# Patient Record
Sex: MALE | Race: WHITE | HISPANIC OR LATINO | URBAN - METROPOLITAN AREA
[De-identification: names, ages, dates, MRNs, and addresses within clinical notes are randomized per-mention and may not be internally consistent; named-entity substitution may affect disease eponyms.]

---

## 2018-06-28 ENCOUNTER — OFFICE VISIT (OUTPATIENT)
Dept: URBAN - METROPOLITAN AREA CLINIC 48 | Facility: CLINIC | Age: 63
End: 2018-06-28
Payer: MEDICAID

## 2018-06-28 PROCEDURE — 99213 OFFICE O/P EST LOW 20 MIN: CPT | Performed by: OPHTHALMOLOGY

## 2018-06-28 ASSESSMENT — INTRAOCULAR PRESSURE
OD: 14
OS: 15

## 2018-06-28 NOTE — IMPRESSION/PLAN
Impression: Type 2 diabetes mellitus w/o complication: J37.5. Plan: Diabetes type II: no background retinopathy, no signs of neovascularization noted. Discussed ocular and systemic benefits of blood sugar control.

## 2020-10-05 ENCOUNTER — OFFICE VISIT (OUTPATIENT)
Dept: URBAN - METROPOLITAN AREA CLINIC 48 | Facility: CLINIC | Age: 65
End: 2020-10-05
Payer: COMMERCIAL

## 2020-10-05 DIAGNOSIS — E11.9 TYPE 2 DIABETES MELLITUS W/O COMPLICATION: Primary | ICD-10-CM

## 2020-10-05 PROCEDURE — 92004 COMPRE OPH EXAM NEW PT 1/>: CPT | Performed by: OPTOMETRIST

## 2020-10-05 ASSESSMENT — INTRAOCULAR PRESSURE
OD: 17
OS: 17

## 2020-10-05 NOTE — IMPRESSION/PLAN
Impression: Dermatochalasis of left upper eyelid: H02.834. Patient interested in talking with Dr. Geremias Gaviria about possible eyelid surgery Plan: Discussed with patient.  

RTC 2-4 weeks lid acacia, T/UT with Reny

## 2020-10-05 NOTE — IMPRESSION/PLAN
Impression: Type 2 diabetes mellitus w/o complication: B67.4. Plan: Discussed with patient the importance of glucose control and ocular risk. 

Follow up annually with dilated fundus exam.

## 2020-11-03 ENCOUNTER — OFFICE VISIT (OUTPATIENT)
Dept: URBAN - METROPOLITAN AREA CLINIC 48 | Facility: CLINIC | Age: 65
End: 2020-11-03
Payer: COMMERCIAL

## 2020-11-03 DIAGNOSIS — H02.831 DERMATOCHALASIS OF RIGHT UPPER EYELID: ICD-10-CM

## 2020-11-03 DIAGNOSIS — H11.052 PERIPHERAL PTERYGIUM, PROGRESSIVE, LEFT EYE: Primary | ICD-10-CM

## 2020-11-03 PROCEDURE — 92081 LIMITED VISUAL FIELD XM: CPT | Performed by: OPHTHALMOLOGY

## 2020-11-03 PROCEDURE — 99214 OFFICE O/P EST MOD 30 MIN: CPT | Performed by: OPHTHALMOLOGY

## 2020-11-03 RX ORDER — OFLOXACIN 3 MG/ML
0.3 % SOLUTION/ DROPS OPHTHALMIC
Qty: 1 | Refills: 0 | Status: INACTIVE
Start: 2020-11-03 | End: 2021-06-17

## 2020-11-03 RX ORDER — PREDNISOLONE ACETATE 10 MG/ML
1 % SUSPENSION/ DROPS OPHTHALMIC
Qty: 1 | Refills: 2 | Status: INACTIVE
Start: 2020-11-03 | End: 2021-06-17

## 2020-11-03 ASSESSMENT — INTRAOCULAR PRESSURE
OD: 16
OS: 15

## 2020-11-03 NOTE — ASSESSMENT/PLAN
Impression: Visual Field - OD: Good-no visual field obstruction; OS: Good-no visual field obstruction Plan: See plan #1.

## 2020-11-03 NOTE — IMPRESSION/PLAN
Impression: Peripheral pterygium, progressive, left eye: H11.052. Plan: Discussed diagnosis in detail with patient. Discussed treatment options with patient. Surgical risks and benefits were discussed, explained and understood by patient. Surgical treatment is required. Patient elects to proceed with surgery. Recommend Pterygium Excision w/o Graft and w/ MMC of the Left Eye. RL2.

*Patient is currently being treated during COVID-19 epidemic, which limits our availability to establish proper follow up care. Patient understands these limitations, and is aware that we will continue treatment and follow up based on our availability, as determined by local state and federal guidelines.

## 2020-11-03 NOTE — IMPRESSION/PLAN
Impression: Dermatochalasis of left upper eyelid: H02.834. No visual obstruction. Plan: See plan #1.

## 2020-12-07 ENCOUNTER — SURGERY (OUTPATIENT)
Dept: URBAN - METROPOLITAN AREA SURGERY 26 | Facility: SURGERY | Age: 65
End: 2020-12-07
Payer: COMMERCIAL

## 2020-12-07 PROCEDURE — 65420 REMOVAL OF EYE LESION: CPT | Performed by: OPHTHALMOLOGY

## 2020-12-07 RX ORDER — TRAMADOL HYDROCHLORIDE 50 MG/1
50 MG TABLET, FILM COATED ORAL
Qty: 20 | Refills: 0 | Status: INACTIVE
Start: 2020-12-07 | End: 2020-12-11

## 2020-12-10 ENCOUNTER — POST-OPERATIVE VISIT (OUTPATIENT)
Dept: URBAN - METROPOLITAN AREA CLINIC 48 | Facility: CLINIC | Age: 65
End: 2020-12-10
Payer: COMMERCIAL

## 2020-12-10 DIAGNOSIS — Z48.810 ENCOUNTER FOR SURGICAL AFTERCARE FOLLOWING SURGERY ON A SENSE ORGAN: Primary | ICD-10-CM

## 2020-12-10 PROCEDURE — 99024 POSTOP FOLLOW-UP VISIT: CPT | Performed by: OPTOMETRIST

## 2020-12-10 NOTE — IMPRESSION/PLAN
Impression: S/P Excision of pterygium with or without graft OS - 3 Days. Encounter for surgical aftercare following surgery on a sense organ  Z48.810. Plan: Continue Pred and Oflox 6x daily OS Keep renetta w/ Dr. Alicia Hagan

## 2020-12-15 ENCOUNTER — POST-OPERATIVE VISIT (OUTPATIENT)
Dept: URBAN - METROPOLITAN AREA CLINIC 48 | Facility: CLINIC | Age: 65
End: 2020-12-15
Payer: COMMERCIAL

## 2020-12-15 DIAGNOSIS — Z48.89 ENCOUNTER FOR OTHER SPECIFIED SURGICAL AFTERCARE: Primary | ICD-10-CM

## 2020-12-15 PROCEDURE — 99024 POSTOP FOLLOW-UP VISIT: CPT | Performed by: OPHTHALMOLOGY

## 2020-12-15 ASSESSMENT — INTRAOCULAR PRESSURE: OS: 13

## 2020-12-15 NOTE — IMPRESSION/PLAN
Impression: S/P Excision of pterygium with or without graft OS - 8 Days. Encounter for other specified surgical aftercare  Z48.89.

*Patient is currently being treated during COVID-19 epidemic, which limits our availability to establish proper follow up care. Patient understands these limitations, and is aware that we will continue treatment and follow up based on our availability, as determined by local state and federal guidelines.  Plan: -- Patient to continue Pred Forte QID OS, 1 month

## 2021-01-26 ENCOUNTER — POST-OPERATIVE VISIT (OUTPATIENT)
Dept: URBAN - METROPOLITAN AREA CLINIC 48 | Facility: CLINIC | Age: 66
End: 2021-01-26
Payer: COMMERCIAL

## 2021-01-26 PROCEDURE — 99024 POSTOP FOLLOW-UP VISIT: CPT | Performed by: OPHTHALMOLOGY

## 2021-01-26 ASSESSMENT — INTRAOCULAR PRESSURE
OD: 14
OS: 12

## 2021-01-26 NOTE — IMPRESSION/PLAN
Impression: S/P Excision of pterygium with or without graft OS - 50 Days. Encounter for other specified surgical aftercare  Z48.89. Post operative instructions reviewed - Plan: *Patient is currently being treated during COVID-19 epidemic, which limits our availability to establish proper follow up care. Patient understands these limitations, and is aware that we will continue treatment and follow up based on our availability, as determined by local state and federal guidelines. -- Recommend patient use Artifical Tears 

follow up with Dr. Vince Echols in [facility]

## 2021-02-04 ENCOUNTER — OFFICE VISIT (OUTPATIENT)
Dept: URBAN - METROPOLITAN AREA CLINIC 48 | Facility: CLINIC | Age: 66
End: 2021-02-04
Payer: COMMERCIAL

## 2021-02-04 DIAGNOSIS — H49.12 4TH NERVE PALSY OF LEFT EYE: Primary | ICD-10-CM

## 2021-02-04 PROCEDURE — 92014 COMPRE OPH EXAM EST PT 1/>: CPT | Performed by: OPTOMETRIST

## 2021-02-04 ASSESSMENT — INTRAOCULAR PRESSURE
OS: 14
OD: 17

## 2021-02-04 NOTE — IMPRESSION/PLAN
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 The patent reports double vision. Plan: Discussed with the patient. Does not require any further imaging and treatment. Pt would like neuro imaging done and will speak to PCP For order. Advised him to RTC in 2 weeks for Diplopia Eval with Dr. Kwabena Mascorro.

## 2021-02-18 ENCOUNTER — OFFICE VISIT (OUTPATIENT)
Dept: URBAN - METROPOLITAN AREA CLINIC 48 | Facility: CLINIC | Age: 66
End: 2021-02-18
Payer: COMMERCIAL

## 2021-02-18 DIAGNOSIS — H25.13 AGE-RELATED NUCLEAR CATARACT, BILATERAL: Primary | ICD-10-CM

## 2021-02-18 DIAGNOSIS — H53.2 DIPLOPIA: ICD-10-CM

## 2021-02-18 DIAGNOSIS — H02.834 DERMATOCHALASIS OF LEFT UPPER EYELID: ICD-10-CM

## 2021-02-18 PROCEDURE — 99213 OFFICE O/P EST LOW 20 MIN: CPT | Performed by: OPHTHALMOLOGY

## 2021-02-18 ASSESSMENT — INTRAOCULAR PRESSURE
OD: 16
OS: 16

## 2021-02-18 NOTE — IMPRESSION/PLAN
Impression: Age-related nuclear cataract, bilateral: H25.13. Plan: Decreased vision in OS due to Cataract worsening. Recommend Cataract Evaluation in 3-4wks, sooner if vision worsens.

## 2021-02-18 NOTE — IMPRESSION/PLAN
Impression: Diplopia: H53.2. Plan: on ACT, there is a very small XT flick and LHT flick in some gages, but much improved. Consistent with patient reporting of diplopia improving. Patient to let us know if condition worsens for follow up.

## 2021-03-18 ENCOUNTER — OFFICE VISIT (OUTPATIENT)
Dept: URBAN - METROPOLITAN AREA CLINIC 48 | Facility: CLINIC | Age: 66
End: 2021-03-18
Payer: COMMERCIAL

## 2021-03-18 DIAGNOSIS — H11.062 RECURRENT PTERYGIUM OF LEFT EYE: ICD-10-CM

## 2021-03-18 DIAGNOSIS — H25.12 AGE-RELATED NUCLEAR CATARACT, LEFT EYE: Primary | ICD-10-CM

## 2021-03-18 PROCEDURE — 92014 COMPRE OPH EXAM EST PT 1/>: CPT | Performed by: OPHTHALMOLOGY

## 2021-03-18 ASSESSMENT — VISUAL ACUITY: OD: 20/20

## 2021-03-18 ASSESSMENT — INTRAOCULAR PRESSURE
OS: 14
OD: 14

## 2021-03-18 ASSESSMENT — KERATOMETRY
OD: 43.63
OS: 44.00

## 2021-03-18 NOTE — IMPRESSION/PLAN
Impression: Age-related nuclear cataract, left eye: H25.12. Plan: The patient has a visually significant cataract in the left eye. After discussion with the patient and careful examination it has been determined that a cataract in the left eye is accounting for a significant amount of the patient's visual symptoms. Cataract surgery and the associated risks, benefits, alternatives, expectations, and recovery were discussed in detail with the patient. All questions were answered. The patient understands that there may be some limitation in visual potential given any pre-existing ocular disease. Discussed option of eye drops with patient, patient elects Prednisolone, Ketorolac and Ofloxacin drops   , discussed possible side effects of drops. The patient desires cataract surgery in the left eye. The patient desires cataract surgery in the left eye. Schedule cataract surgery in the left eye. RL 2 Patient candidate for Premium work up  if patient desires Surgeon: Dr. Ward Bhardwaj RTC  6 months Patient would like to hold off on surgery

## 2021-06-17 ENCOUNTER — OFFICE VISIT (OUTPATIENT)
Dept: URBAN - METROPOLITAN AREA CLINIC 48 | Facility: CLINIC | Age: 66
End: 2021-06-17
Payer: COMMERCIAL

## 2021-06-17 DIAGNOSIS — G51.0 BELL'S PALSY: ICD-10-CM

## 2021-06-17 DIAGNOSIS — H16.213 EXPOSURE KERATOCONJUNCTIVITIS, BILATERAL: Primary | ICD-10-CM

## 2021-06-17 PROCEDURE — 99213 OFFICE O/P EST LOW 20 MIN: CPT | Performed by: OPTOMETRIST

## 2021-06-17 RX ORDER — NEOMYCIN, POLYMYXIN B SULFATES, DEXAMETHASONE 1; 3.5; 1 MG/G; MG/G; [USP'U]/G
OINTMENT OPHTHALMIC
Qty: 3.5 | Refills: 1 | Status: ACTIVE
Start: 2021-06-17

## 2021-06-17 ASSESSMENT — INTRAOCULAR PRESSURE
OD: 16
OS: 16

## 2021-06-17 NOTE — IMPRESSION/PLAN
Impression: Bell's palsy: G51.0. Plan: Informed patient to follow up with PCP at [facility] for further evaluation. Letter sent See plan 1

## 2021-06-17 NOTE — IMPRESSION/PLAN
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 Plan: Symptomatic x4 days. Discussed with patient. Start Murali/poly/dex SUNSHINE TID to affected eye. Recommend taping eye closed at night RTC monday for follow up with Dr. Smiley Patel

## 2021-06-21 ENCOUNTER — OFFICE VISIT (OUTPATIENT)
Dept: URBAN - METROPOLITAN AREA CLINIC 48 | Facility: CLINIC | Age: 66
End: 2021-06-21
Payer: COMMERCIAL

## 2021-06-21 PROCEDURE — 92012 INTRM OPH EXAM EST PATIENT: CPT | Performed by: STUDENT IN AN ORGANIZED HEALTH CARE EDUCATION/TRAINING PROGRAM

## 2021-06-21 ASSESSMENT — INTRAOCULAR PRESSURE
OD: 14
OS: 15

## 2021-06-21 NOTE — IMPRESSION/PLAN
Impression: Exposure keratoconjunctivitis, bilateral: O22.257. Plan: Improving symptomatically as well as on exam
Continue NPD yogi TID OS Continue to tape lid at night while asleep Pt evaluated in ER and has follow-up with PCP, encourage continued follow-up RTC 1 week for K check

## 2021-06-28 ENCOUNTER — OFFICE VISIT (OUTPATIENT)
Dept: URBAN - METROPOLITAN AREA CLINIC 48 | Facility: CLINIC | Age: 66
End: 2021-06-28
Payer: COMMERCIAL

## 2021-06-28 PROCEDURE — 92012 INTRM OPH EXAM EST PATIENT: CPT | Performed by: STUDENT IN AN ORGANIZED HEALTH CARE EDUCATION/TRAINING PROGRAM

## 2021-06-28 ASSESSMENT — INTRAOCULAR PRESSURE
OD: 18
OS: 18

## 2021-06-28 NOTE — IMPRESSION/PLAN
Impression: Exposure keratoconjunctivitis, bilateral: F28.475. Plan: OS Continue NPD yogi TID Start OTC AFT' s up to QID, if using more than QID use PF AFAT's, OTC Refresh PM QHS
-(recommended Blink, Systane or Refresh) Continue to tape lid at night while asleep Continue with PCP, encourage continued follow-up. If no changes next ov. consider extending follow up appts.  

RTC 2-3 weeks for K check (short exam)

## 2021-07-19 ENCOUNTER — OFFICE VISIT (OUTPATIENT)
Dept: URBAN - METROPOLITAN AREA CLINIC 48 | Facility: CLINIC | Age: 66
End: 2021-07-19
Payer: COMMERCIAL

## 2021-07-19 PROCEDURE — 92012 INTRM OPH EXAM EST PATIENT: CPT | Performed by: STUDENT IN AN ORGANIZED HEALTH CARE EDUCATION/TRAINING PROGRAM

## 2021-07-19 ASSESSMENT — INTRAOCULAR PRESSURE
OS: 15
OD: 10

## 2021-07-19 NOTE — IMPRESSION/PLAN
Impression: Exposure keratoconjunctivitis, bilateral: E85.716. Plan: OS

-D/C Viazyl (self prescribed contains lidocaine) -Start OTC Ketotifen/Zaditor for itching and d/c when condition improves
-Continue NPD yogi TID 
-Continue  OTC AFT' s up to QID, if using more than QID use PF AFAT's, OTC Refresh PM QHS -(previously recommended Blink, Systane or Refresh)
- Continue to tape lid at night while asleep
- Continue with PCP, encourage continued follow-up. 

RTC 2 months for K check (short exam)

## 2021-09-27 ENCOUNTER — OFFICE VISIT (OUTPATIENT)
Dept: URBAN - METROPOLITAN AREA CLINIC 48 | Facility: CLINIC | Age: 66
End: 2021-09-27
Payer: COMMERCIAL

## 2021-09-27 PROCEDURE — 92012 INTRM OPH EXAM EST PATIENT: CPT | Performed by: STUDENT IN AN ORGANIZED HEALTH CARE EDUCATION/TRAINING PROGRAM

## 2021-09-27 ASSESSMENT — INTRAOCULAR PRESSURE
OS: 15
OD: 14

## 2021-09-27 NOTE — IMPRESSION/PLAN
Impression: Exposure keratoconjunctivitis, bilateral: P30.340. Left. Plan: Improving OS


-Continue  OTC Ketotifen/Zaditor for itching and d/c when condition improves
-Continue NPD yogi TID 
-Continue  OTC AFT' s up to QID, if using more than QID use PF AFAT's, OTC Refresh PM QHS -(previously recommended Blink, Systane or Refresh)
- Continue to tape lid at night while asleep RTC 6 month Cornea check

## 2022-03-15 ENCOUNTER — OFFICE VISIT (OUTPATIENT)
Dept: URBAN - METROPOLITAN AREA CLINIC 48 | Facility: CLINIC | Age: 67
End: 2022-03-15
Payer: MEDICARE

## 2022-03-15 PROCEDURE — 92012 INTRM OPH EXAM EST PATIENT: CPT | Performed by: STUDENT IN AN ORGANIZED HEALTH CARE EDUCATION/TRAINING PROGRAM

## 2022-03-15 ASSESSMENT — INTRAOCULAR PRESSURE
OD: 16
OS: 14

## 2022-03-15 NOTE — IMPRESSION/PLAN
Impression: Exposure keratoconjunctivitis, bilateral: M79.165. Left. Plan: Discussed need for continued use of drops - Restart AT QID OU
- REstart pataday qd ou
- Can d/c NPD yogi, if no improvement with above drops, can restart RTC 6 month for [facility]

## 2022-09-15 ENCOUNTER — OFFICE VISIT (OUTPATIENT)
Dept: URBAN - METROPOLITAN AREA CLINIC 48 | Facility: CLINIC | Age: 67
End: 2022-09-15
Payer: MEDICARE

## 2022-09-15 DIAGNOSIS — H16.213 EXPOSURE KERATOCONJUNCTIVITIS, BILATERAL: Primary | ICD-10-CM

## 2022-09-15 DIAGNOSIS — H25.13 AGE-RELATED NUCLEAR CATARACT, BILATERAL: ICD-10-CM

## 2022-09-15 DIAGNOSIS — E11.9 TYPE 2 DIABETES MELLITUS W/O COMPLICATION: ICD-10-CM

## 2022-09-15 PROCEDURE — 99214 OFFICE O/P EST MOD 30 MIN: CPT | Performed by: STUDENT IN AN ORGANIZED HEALTH CARE EDUCATION/TRAINING PROGRAM

## 2022-09-15 ASSESSMENT — INTRAOCULAR PRESSURE
OD: 14
OS: 15

## 2022-09-15 NOTE — IMPRESSION/PLAN
Impression: Type 2 diabetes mellitus w/o complication: C88.5.  Plan: - No retinopathy seen on exam today
- Continue glucose, BP, and lipid control as per PCP
- Continue with annual DFE

## 2022-09-15 NOTE — IMPRESSION/PLAN
Impression: Exposure keratoconjunctivitis, bilateral: J79.652. Plan: Doing well on current regiment Continue AFT and Pataday as needed RTC 6 months

## 2023-03-13 NOTE — IMPRESSION/PLAN
Impression: Dermatochalasis of right upper eyelid: H02.831. Plan: Patient interested in evaluation with Dr. Eva Elizabeth. Head injury

## 2023-03-29 ENCOUNTER — OFFICE VISIT (OUTPATIENT)
Dept: URBAN - METROPOLITAN AREA CLINIC 48 | Facility: CLINIC | Age: 68
End: 2023-03-29
Payer: MEDICARE

## 2023-03-29 DIAGNOSIS — H04.123 DRY EYE SYNDROME OF BILATERAL LACRIMAL GLANDS: Primary | ICD-10-CM

## 2023-03-29 PROCEDURE — 99213 OFFICE O/P EST LOW 20 MIN: CPT | Performed by: STUDENT IN AN ORGANIZED HEALTH CARE EDUCATION/TRAINING PROGRAM

## 2023-03-29 ASSESSMENT — INTRAOCULAR PRESSURE
OS: 13
OD: 12

## 2023-03-29 NOTE — IMPRESSION/PLAN
Impression: Dry eye syndrome of bilateral lacrimal glands: H04.123. Plan: Dry eyes likely cause for the patient's complaints. Explained the nature of the condition and need for increase lubrication with OTCGenteal gel. OU Genteal QHS or up to QID

## 2023-05-30 ENCOUNTER — OFFICE VISIT (OUTPATIENT)
Dept: URBAN - METROPOLITAN AREA CLINIC 48 | Facility: CLINIC | Age: 68
End: 2023-05-30
Payer: MEDICARE

## 2023-05-30 DIAGNOSIS — H04.123 DRY EYE SYNDROME OF BILATERAL LACRIMAL GLANDS: Primary | ICD-10-CM

## 2023-05-30 PROCEDURE — 99213 OFFICE O/P EST LOW 20 MIN: CPT | Performed by: STUDENT IN AN ORGANIZED HEALTH CARE EDUCATION/TRAINING PROGRAM

## 2023-05-30 ASSESSMENT — INTRAOCULAR PRESSURE
OD: 14
OS: 14

## 2023-05-30 NOTE — IMPRESSION/PLAN
Impression: Dry eye syndrome of bilateral lacrimal glands: H04.123. Plan: Disc. with patient. d/c steroid and start preservative free artificial tears. OU
D/C PF Continue Genteal QHS Start PF Systane Consider Restasis if no improvement.  

RTC 1 months for dry eye follow up (long exam)

## 2023-06-30 ENCOUNTER — OFFICE VISIT (OUTPATIENT)
Dept: URBAN - METROPOLITAN AREA CLINIC 48 | Facility: CLINIC | Age: 68
End: 2023-06-30
Payer: MEDICARE

## 2023-06-30 DIAGNOSIS — H04.123 DRY EYE SYNDROME OF BILATERAL LACRIMAL GLANDS: Primary | ICD-10-CM

## 2023-06-30 PROCEDURE — 99213 OFFICE O/P EST LOW 20 MIN: CPT | Performed by: STUDENT IN AN ORGANIZED HEALTH CARE EDUCATION/TRAINING PROGRAM

## 2023-06-30 RX ORDER — CYCLOSPORINE 0 G/ML
0.09 % SOLUTION/ DROPS OPHTHALMIC; TOPICAL
Qty: 180 | Refills: 3 | Status: ACTIVE
Start: 2023-06-30

## 2023-06-30 ASSESSMENT — INTRAOCULAR PRESSURE
OS: 17
OD: 18

## 2023-06-30 NOTE — IMPRESSION/PLAN
Impression: Dry eye syndrome of bilateral lacrimal glands: H04.123. Plan: Patient tried and failed Prednisolone Acetate 1%, Refresh artificial tears, genteal gel and preservative Systane artificial tears. OU Continue PF Systane Start Mercer (erx sent to [facility] pharmacy) - Patient knows if ins. approves will gtt. will be mailed to him. 


RTC 2 months for dry eye follow up (long exam)

## 2023-08-31 ENCOUNTER — OFFICE VISIT (OUTPATIENT)
Dept: URBAN - METROPOLITAN AREA CLINIC 48 | Facility: CLINIC | Age: 68
End: 2023-08-31
Payer: MEDICARE

## 2023-08-31 DIAGNOSIS — H04.123 DRY EYE SYNDROME OF BILATERAL LACRIMAL GLANDS: Primary | ICD-10-CM

## 2023-08-31 PROCEDURE — 99212 OFFICE O/P EST SF 10 MIN: CPT | Performed by: STUDENT IN AN ORGANIZED HEALTH CARE EDUCATION/TRAINING PROGRAM

## 2023-08-31 ASSESSMENT — INTRAOCULAR PRESSURE
OS: 14
OD: 15

## 2023-10-30 ENCOUNTER — OFFICE VISIT (OUTPATIENT)
Dept: URBAN - METROPOLITAN AREA CLINIC 48 | Facility: CLINIC | Age: 68
End: 2023-10-30
Payer: MEDICARE

## 2023-10-30 DIAGNOSIS — H04.123 DRY EYE SYNDROME OF BILATERAL LACRIMAL GLANDS: Primary | ICD-10-CM

## 2023-10-30 PROCEDURE — 99213 OFFICE O/P EST LOW 20 MIN: CPT | Performed by: STUDENT IN AN ORGANIZED HEALTH CARE EDUCATION/TRAINING PROGRAM

## 2023-10-30 ASSESSMENT — INTRAOCULAR PRESSURE
OS: 15
OD: 15

## 2025-04-29 NOTE — IMPRESSION/PLAN
Impression: Dermatochalasis of right upper eyelid: H02.831. No visual obstruction. Plan: See plan #1. Would recommend he repeats WBC in about 10 to 12 days to make sure it is not continuing to rise.  Keep up fluids for the diarrhea.